# Patient Record
Sex: FEMALE | Race: ASIAN | NOT HISPANIC OR LATINO | URBAN - METROPOLITAN AREA
[De-identification: names, ages, dates, MRNs, and addresses within clinical notes are randomized per-mention and may not be internally consistent; named-entity substitution may affect disease eponyms.]

---

## 2021-07-21 ENCOUNTER — EMERGENCY (EMERGENCY)
Facility: HOSPITAL | Age: 41
LOS: 1 days | Discharge: STILL ADMITTED OR ADMINISTRATIVE | End: 2021-07-21
Admitting: EMERGENCY MEDICINE
Payer: COMMERCIAL

## 2021-07-21 ENCOUNTER — INPATIENT (INPATIENT)
Facility: HOSPITAL | Age: 41
LOS: 0 days | Discharge: HOME | End: 2021-07-22
Attending: SPECIALIST | Admitting: SPECIALIST
Payer: COMMERCIAL

## 2021-07-21 VITALS
OXYGEN SATURATION: 98 % | HEART RATE: 107 BPM | TEMPERATURE: 98 F | RESPIRATION RATE: 18 BRPM | DIASTOLIC BLOOD PRESSURE: 124 MMHG | SYSTOLIC BLOOD PRESSURE: 170 MMHG

## 2021-07-21 VITALS — TEMPERATURE: 99 F | DIASTOLIC BLOOD PRESSURE: 88 MMHG | SYSTOLIC BLOOD PRESSURE: 143 MMHG | HEART RATE: 136 BPM

## 2021-07-21 DIAGNOSIS — O14.90 UNSPECIFIED PRE-ECLAMPSIA, UNSPECIFIED TRIMESTER: ICD-10-CM

## 2021-07-21 LAB
ALBUMIN SERPL ELPH-MCNC: 3.9 G/DL — SIGNIFICANT CHANGE UP (ref 3.3–5)
ALP SERPL-CCNC: 102 U/L — SIGNIFICANT CHANGE UP (ref 40–120)
ALT FLD-CCNC: 18 U/L — SIGNIFICANT CHANGE UP (ref 4–33)
ANION GAP SERPL CALC-SCNC: 15 MMOL/L — HIGH (ref 7–14)
APPEARANCE UR: ABNORMAL
APTT BLD: 29.7 SEC — SIGNIFICANT CHANGE UP (ref 27–36.3)
AST SERPL-CCNC: 19 U/L — SIGNIFICANT CHANGE UP (ref 4–32)
BACTERIA # UR AUTO: NEGATIVE — SIGNIFICANT CHANGE UP
BASOPHILS # BLD AUTO: 0.04 K/UL — SIGNIFICANT CHANGE UP (ref 0–0.2)
BASOPHILS NFR BLD AUTO: 0.5 % — SIGNIFICANT CHANGE UP (ref 0–2)
BILIRUB SERPL-MCNC: <0.2 MG/DL — SIGNIFICANT CHANGE UP (ref 0.2–1.2)
BILIRUB UR-MCNC: NEGATIVE — SIGNIFICANT CHANGE UP
BLD GP AB SCN SERPL QL: NEGATIVE — SIGNIFICANT CHANGE UP
BUN SERPL-MCNC: 11 MG/DL — SIGNIFICANT CHANGE UP (ref 7–23)
CALCIUM SERPL-MCNC: 9.4 MG/DL — SIGNIFICANT CHANGE UP (ref 8.4–10.5)
CHLORIDE SERPL-SCNC: 105 MMOL/L — SIGNIFICANT CHANGE UP (ref 98–107)
CO2 SERPL-SCNC: 21 MMOL/L — LOW (ref 22–31)
COLOR SPEC: ABNORMAL
CREAT ?TM UR-MCNC: 13 MG/DL — SIGNIFICANT CHANGE UP
CREAT SERPL-MCNC: 0.62 MG/DL — SIGNIFICANT CHANGE UP (ref 0.5–1.3)
DIFF PNL FLD: ABNORMAL
EOSINOPHIL # BLD AUTO: 0.15 K/UL — SIGNIFICANT CHANGE UP (ref 0–0.5)
EOSINOPHIL NFR BLD AUTO: 1.7 % — SIGNIFICANT CHANGE UP (ref 0–6)
EPI CELLS # UR: 4 /HPF — SIGNIFICANT CHANGE UP (ref 0–5)
FIBRINOGEN PPP-MCNC: 640 MG/DL — HIGH (ref 290–520)
GLUCOSE SERPL-MCNC: 94 MG/DL — SIGNIFICANT CHANGE UP (ref 70–99)
GLUCOSE UR QL: NEGATIVE — SIGNIFICANT CHANGE UP
HCT VFR BLD CALC: 38 % — SIGNIFICANT CHANGE UP (ref 34.5–45)
HGB BLD-MCNC: 11.9 G/DL — SIGNIFICANT CHANGE UP (ref 11.5–15.5)
HYALINE CASTS # UR AUTO: 4 /LPF — SIGNIFICANT CHANGE UP (ref 0–7)
IANC: 6.88 K/UL — SIGNIFICANT CHANGE UP (ref 1.5–8.5)
IMM GRANULOCYTES NFR BLD AUTO: 0.9 % — SIGNIFICANT CHANGE UP (ref 0–1.5)
INR BLD: 0.97 RATIO — SIGNIFICANT CHANGE UP (ref 0.88–1.16)
KETONES UR-MCNC: NEGATIVE — SIGNIFICANT CHANGE UP
LDH SERPL L TO P-CCNC: 196 U/L — SIGNIFICANT CHANGE UP (ref 135–225)
LEUKOCYTE ESTERASE UR-ACNC: ABNORMAL
LYMPHOCYTES # BLD AUTO: 1.28 K/UL — SIGNIFICANT CHANGE UP (ref 1–3.3)
LYMPHOCYTES # BLD AUTO: 14.5 % — SIGNIFICANT CHANGE UP (ref 13–44)
MAGNESIUM SERPL-MCNC: 5.2 MG/DL — HIGH (ref 1.6–2.6)
MCHC RBC-ENTMCNC: 24.8 PG — LOW (ref 27–34)
MCHC RBC-ENTMCNC: 31.3 GM/DL — LOW (ref 32–36)
MCV RBC AUTO: 79.2 FL — LOW (ref 80–100)
MONOCYTES # BLD AUTO: 0.37 K/UL — SIGNIFICANT CHANGE UP (ref 0–0.9)
MONOCYTES NFR BLD AUTO: 4.2 % — SIGNIFICANT CHANGE UP (ref 2–14)
NEUTROPHILS # BLD AUTO: 6.88 K/UL — SIGNIFICANT CHANGE UP (ref 1.8–7.4)
NEUTROPHILS NFR BLD AUTO: 78.2 % — HIGH (ref 43–77)
NITRITE UR-MCNC: NEGATIVE — SIGNIFICANT CHANGE UP
NRBC # BLD: 0 /100 WBCS — SIGNIFICANT CHANGE UP
NRBC # FLD: 0 K/UL — SIGNIFICANT CHANGE UP
PH UR: 7.5 — SIGNIFICANT CHANGE UP (ref 5–8)
PLATELET # BLD AUTO: 210 K/UL — SIGNIFICANT CHANGE UP (ref 150–400)
POTASSIUM SERPL-MCNC: 4.4 MMOL/L — SIGNIFICANT CHANGE UP (ref 3.5–5.3)
POTASSIUM SERPL-SCNC: 4.4 MMOL/L — SIGNIFICANT CHANGE UP (ref 3.5–5.3)
PROT ?TM UR-MCNC: 43 MG/DL — SIGNIFICANT CHANGE UP
PROT ?TM UR-MCNC: 43 MG/DL — SIGNIFICANT CHANGE UP
PROT SERPL-MCNC: 7.1 G/DL — SIGNIFICANT CHANGE UP (ref 6–8.3)
PROT UR-MCNC: ABNORMAL
PROT/CREAT UR-RTO: 3.3 RATIO — HIGH (ref 0–0.2)
PROTHROM AB SERPL-ACNC: 11.2 SEC — SIGNIFICANT CHANGE UP (ref 10.6–13.6)
RBC # BLD: 4.8 M/UL — SIGNIFICANT CHANGE UP (ref 3.8–5.2)
RBC # FLD: 16 % — HIGH (ref 10.3–14.5)
RBC CASTS # UR COMP ASSIST: 21 /HPF — HIGH (ref 0–4)
RH IG SCN BLD-IMP: POSITIVE — SIGNIFICANT CHANGE UP
RH IG SCN BLD-IMP: POSITIVE — SIGNIFICANT CHANGE UP
SARS-COV-2 RNA SPEC QL NAA+PROBE: SIGNIFICANT CHANGE UP
SODIUM SERPL-SCNC: 141 MMOL/L — SIGNIFICANT CHANGE UP (ref 135–145)
SP GR SPEC: 1.01 — LOW (ref 1.01–1.02)
URATE SERPL-MCNC: 6.2 MG/DL — SIGNIFICANT CHANGE UP (ref 2.5–7)
UROBILINOGEN FLD QL: SIGNIFICANT CHANGE UP
WBC # BLD: 8.8 K/UL — SIGNIFICANT CHANGE UP (ref 3.8–10.5)
WBC # FLD AUTO: 8.8 K/UL — SIGNIFICANT CHANGE UP (ref 3.8–10.5)
WBC UR QL: 44 /HPF — HIGH (ref 0–5)

## 2021-07-21 RX ORDER — MAGNESIUM SULFATE 500 MG/ML
2 VIAL (ML) INJECTION
Qty: 40 | Refills: 0 | Status: DISCONTINUED | OUTPATIENT
Start: 2021-07-21 | End: 2021-07-22

## 2021-07-21 RX ORDER — NIFEDIPINE 30 MG
30 TABLET, EXTENDED RELEASE 24 HR ORAL EVERY 24 HOURS
Refills: 0 | Status: DISCONTINUED | OUTPATIENT
Start: 2021-07-21 | End: 2021-07-21

## 2021-07-21 RX ORDER — SODIUM CHLORIDE 9 MG/ML
1000 INJECTION, SOLUTION INTRAVENOUS
Refills: 0 | Status: DISCONTINUED | OUTPATIENT
Start: 2021-07-21 | End: 2021-07-22

## 2021-07-21 RX ORDER — NIFEDIPINE 30 MG
30 TABLET, EXTENDED RELEASE 24 HR ORAL DAILY
Refills: 0 | Status: DISCONTINUED | OUTPATIENT
Start: 2021-07-21 | End: 2021-07-21

## 2021-07-21 RX ORDER — ACETAMINOPHEN 500 MG
975 TABLET ORAL ONCE
Refills: 0 | Status: COMPLETED | OUTPATIENT
Start: 2021-07-21 | End: 2021-07-21

## 2021-07-21 RX ORDER — HEPARIN SODIUM 5000 [USP'U]/ML
5000 INJECTION INTRAVENOUS; SUBCUTANEOUS EVERY 12 HOURS
Refills: 0 | Status: DISCONTINUED | OUTPATIENT
Start: 2021-07-21 | End: 2021-07-22

## 2021-07-21 RX ORDER — NIFEDIPINE 30 MG
30 TABLET, EXTENDED RELEASE 24 HR ORAL AT BEDTIME
Refills: 0 | Status: DISCONTINUED | OUTPATIENT
Start: 2021-07-21 | End: 2021-07-22

## 2021-07-21 RX ORDER — NIFEDIPINE 30 MG
30 TABLET, EXTENDED RELEASE 24 HR ORAL DAILY
Refills: 0 | Status: DISCONTINUED | OUTPATIENT
Start: 2021-07-21 | End: 2021-07-22

## 2021-07-21 RX ORDER — HYDRALAZINE HCL 50 MG
10 TABLET ORAL ONCE
Refills: 0 | Status: COMPLETED | OUTPATIENT
Start: 2021-07-21 | End: 2021-07-21

## 2021-07-21 RX ORDER — MAGNESIUM SULFATE 500 MG/ML
4 VIAL (ML) INJECTION ONCE
Refills: 0 | Status: COMPLETED | OUTPATIENT
Start: 2021-07-21 | End: 2021-07-21

## 2021-07-21 RX ORDER — MAGNESIUM SULFATE 500 MG/ML
2 VIAL (ML) INJECTION
Qty: 40 | Refills: 0 | Status: DISCONTINUED | OUTPATIENT
Start: 2021-07-21 | End: 2021-07-21

## 2021-07-21 RX ADMIN — Medication 975 MILLIGRAM(S): at 21:14

## 2021-07-21 RX ADMIN — Medication 50 GM/HR: at 16:41

## 2021-07-21 RX ADMIN — Medication 975 MILLIGRAM(S): at 20:14

## 2021-07-21 RX ADMIN — Medication 30 MILLIGRAM(S): at 20:19

## 2021-07-21 RX ADMIN — HEPARIN SODIUM 5000 UNIT(S): 5000 INJECTION INTRAVENOUS; SUBCUTANEOUS at 20:19

## 2021-07-21 RX ADMIN — Medication 10 MILLIGRAM(S): at 16:02

## 2021-07-21 RX ADMIN — Medication 200 GRAM(S): at 16:17

## 2021-07-21 RX ADMIN — SODIUM CHLORIDE 50 MILLILITER(S): 9 INJECTION, SOLUTION INTRAVENOUS at 18:55

## 2021-07-21 RX ADMIN — Medication 10 MILLIGRAM(S): at 16:15

## 2021-07-21 RX ADMIN — Medication 50 GM/HR: at 21:06

## 2021-07-21 NOTE — H&P ADULT - NSHPPHYSICALEXAM_GEN_ALL_CORE
abdomen: soft, nontender  lungs: clear bilaterally  heart: regular rate and rhythm  extremities: bilateral pedal edema  Vital Signs Last 24 Hrs  T(C): 36.7 (21 Jul 2021 15:31), Max: 36.7 (21 Jul 2021 15:31)  T(F): 98 (21 Jul 2021 15:31), Max: 98 (21 Jul 2021 15:31)  HR: 107 (21 Jul 2021 15:31) (107 - 107)  BP: 170/124 (21 Jul 2021 15:31) (170/124 - 170/124)  BP(mean): --  RR: 18 (21 Jul 2021 15:31) (18 - 18)  SpO2: 98% (21 Jul 2021 15:31) (98% - 98%) abdomen: soft, nontender  lungs: clear bilaterally  heart: regular rate and rhythm  extremities: bilateral pedal edema    Vital Signs Last 24 Hrs  T(C): 36.7 (21 Jul 2021 15:31), Max: 36.7 (21 Jul 2021 15:31)  T(F): 98 (21 Jul 2021 15:31), Max: 98 (21 Jul 2021 15:31)  HR: 107 (21 Jul 2021 15:31) (107 - 107)  BP: 170/124 (21 Jul 2021 15:31) (170/124 - 170/124)  BP(mean): --  RR: 18 (21 Jul 2021 15:31) (18 - 18)  SpO2: 98% (21 Jul 2021 15:31) (98% - 98%) abdomen: soft, nontender  lungs: clear bilaterally  heart: regular rate and rhythm  extremities: bilateral pedal edema  +3 DTR's    Vital Signs Last 24 Hrs  T(C): 36.7 (21 Jul 2021 15:31), Max: 36.7 (21 Jul 2021 15:31)  T(F): 98 (21 Jul 2021 15:31), Max: 98 (21 Jul 2021 15:31)  HR: 107 (21 Jul 2021 15:31) (107 - 107)  BP: 170/124 (21 Jul 2021 15:31) (170/124 - 170/124)  BP(mean): --  RR: 18 (21 Jul 2021 15:31) (18 - 18)  SpO2: 98% (21 Jul 2021 15:31) (98% - 98%)

## 2021-07-21 NOTE — ED ADULT TRIAGE NOTE - CHIEF COMPLAINT QUOTE
p/t is 5 days postpartum c/o of high b/p readings for past few days, sent by PMD for eval, p/t denies any chest pain denies sob - pre renal secondary to hypotention   - continue to trend creat   - if worsening renal function will check urine lytes / renal US

## 2021-07-21 NOTE — CHART NOTE - NSCHARTNOTEFT_GEN_A_CORE
R4 OB Chart Note    Patient seen at bedside for severe range BP 160s/110s s/p Hydral 10 x 1    40y  PPD#5 s/p  at [facility] s/p IOL for worsening chronic hypertension now presenting with severe range BPs at home.     Patient reports that she has had labile blood pressures prior to pregnancy which improved in the second trimester. Blood pressures then increased several weeks prior to delivery. BPs for the last few weeks 140s/90s, severe ranges. Delivery was uncomplicated--she did not require Mg, and was not started on antihypertensives. At home, her blood pressures have been stable until this morning--160s/90s. She was evaluated by her Ob/Gyn, where BPs where still severe so she was sent to the hospital.     Patient denies headache, blurry vision, chest pain, shortness of breath, epigastric pain, nausea, or vomiting.     # sPEC  - s/p Hydral 10 IV x 2  - start MgSO4 for seizure prophylaxis x 24h  - Mg levels q6h  - Monitor clinically for Mg toxicity  - f/u HELLP labs  - start Procardia 30XL if BPs stable after IV pushes  - patient counseled re: PEC signs, symptoms, expectations, and reviewed plan of care. Patient agrees with plan, confirms that she has blood pressure medication    # postpartum  - Regular diet  - patient breastfeeding and pumping--baby admitted to [facility] for hyperbilirubinemia. Breastpump to be provided, patient reassured of safety of all medications in breastfeeding.    d/w Dr. Eamon Whitley PGY4 R4 OB Chart Note    Patient seen at bedside for severe range BP 160s/110s s/p Hydral 10 x 1    40y  PPD#5 s/p  at [facility] s/p IOL for worsening chronic hypertension now presenting with severe range BPs at home.     Patient reports that she has had labile blood pressures prior to pregnancy which improved in the second trimester. Blood pressures then increased several weeks prior to delivery. BPs for the last few weeks 140s/90s, severe ranges. Delivery was uncomplicated--she did not require Mg, and was not started on antihypertensives. At home, her blood pressures have been stable until this morning--160s/90s. She was evaluated by her Ob/Gyn, where BPs where still severe so she was sent to the hospital.     Patient denies headache, blurry vision, chest pain, shortness of breath, epigastric pain, nausea, or vomiting.     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 Patient agrees with plan, confirms that she has blood pressure medication    # postpartum  - Regular diet  - patient breastfeeding and pumping--baby admitted to [facility] for hyperbilirubinemia. Breastpump to be provided, patient reassured of safety of all medications in breastfeeding.    d/w Dr. Eamon Whitley PGY4

## 2021-07-21 NOTE — H&P ADULT - HISTORY OF PRESENT ILLNESS
This is a 39 y/o  s/p  on 21 at [facility] presents today to triage for elevated blood pressures at home of 170/110. She was induced on  for elevated blood pressures, denies magnesium or blood pressure medications. Denies headache, n/v, visual changes, epigastric pain    Allergies: Latex, shellfish (hives, itching, swelling)  Current medications:  -PNV  OBGYN history:  -  @35.5 weeks 6lbs 2oz  - 21 @ 37.1 wks  -1 SAB @ 19 wks complete  Medical history This is a 39 y/o  s/p  on 21 at [facility] presents today to triage for elevated blood pressures at home of 170/110. She was induced on  for elevated blood pressures s/p cerclage removal, denies magnesium or blood pressure medications. Pt was GDMA2 on Glyburide during pregnancy. Denies headache, n/v, visual changes, epigastric pain.    Allergies: Latex, shellfish (hives, itching, swelling)  Current medications:  -PNV  OBGYN history:  -  @35.5 weeks 6lbs 2oz  - 21 @ 37.1 wks  -1 SAB @ 19 wks complete  Medical history:  -asthma, as a child  Denies surgical history This is a 39 y/o  s/p  @ 37.1 wks gestation on 21 at [facility] presents today to triage for elevated blood pressures at home of 170/110. She was induced on  for elevated BP's, denies magnesium or blood pressure medications. Pt was GDMA2 on Glyburide during pregnancy with a cerclage from 16-37 weeks gestation for h/o shortened cervix. Baby had elevated bilirubin levels after f/u visit w/ pediatrician, admitted to [facility] NICU. Denies headache, n/v, visual changes, epigastric pain.    Allergies: Latex, shellfish (hives, itching, swelling)  Current medications:  -PNV  OBGYN history:  -  @35.5 weeks 6lbs 2oz  - 21 @ 37.1 wks  -1 SAB @ 19 wks complete  Medical history:  -asthma, as a child  Denies surgical history

## 2021-07-21 NOTE — H&P ADULT - NSHPLABSRESULTS_GEN_ALL_CORE
11.9   8.80  )-----------( 210      ( 2021 16:38 )             38.0     Urinalysis Basic - ( 2021 16:38 )  Color: Light Brown / Appearance: Slightly Turbid / S.006 / pH: x  Gluc: x / Ketone: Negative  / Bili: Negative / Urobili: <2 mg/dL   Blood: x / Protein: 30 mg/dL / Nitrite: Negative   Leuk Esterase: Large / RBC: 21 /HPF / WBC 44 /HPF   Sq Epi: x / Non Sq Epi: 4 /HPF / Bacteria: Negative    PT/INR - ( 2021 16:38 )   PT: 11.2 sec;   INR: 0.97 ratio         PTT - ( 2021 16:38 )  PTT:29.7 sec 11.9   8.80  )-----------( 210      ( 2021 16:38 )             38.0     Urinalysis Basic - ( 2021 16:38 )  Color: Light Brown / Appearance: Slightly Turbid / S.006 / pH: x  Gluc: x / Ketone: Negative  / Bili: Negative / Urobili: <2 mg/dL   Blood: x / Protein: 30 mg/dL / Nitrite: Negative   Leuk Esterase: Large / RBC: 21 /HPF / WBC 44 /HPF   Sq Epi: x / Non Sq Epi: 4 /HPF / Bacteria: Negative    PT/INR - ( 2021 16:38 )   PT: 11.2 sec;   INR: 0.97 ratio         PTT - ( 2021 16:38 )  PTT:29.7 sec        141  |  105  |  11  ----------------------------<  94  4.4   |  21<L>  |  0.62    Ca    9.4      2021 16:38    TPro  7.1  /  Alb  3.9  /  TBili  <0.2  /  DBili  x   /  AST  19  /  ALT  18  /  AlkPhos  102  -

## 2021-07-21 NOTE — H&P ADULT - ASSESSMENT
This is a 39 y/o  s/p  on 21 presents to triage for elevated blood pressures of 170/110 This is a 39 y/o  s/p  on 21 presents to triage for elevated blood pressures of 170/110.    16:02 - assessment reveals elevated BP of 168/102, following 177/119; 10mg hydralazine IVP given  16:15 - repeat /112; 10mg hydralazine IVP given. Magnesium started for seizure prophylaxis  d/w MD Knutson  17:00 - EKG ordered for sustained elevated pulse rate of 130bpm    17:30 - plan d/w MD Phelps. Will reassess at 18:00

## 2021-07-22 ENCOUNTER — TRANSCRIPTION ENCOUNTER (OUTPATIENT)
Age: 41
End: 2021-07-22

## 2021-07-22 VITALS
TEMPERATURE: 98 F | SYSTOLIC BLOOD PRESSURE: 128 MMHG | HEART RATE: 96 BPM | DIASTOLIC BLOOD PRESSURE: 89 MMHG | RESPIRATION RATE: 17 BRPM | OXYGEN SATURATION: 99 %

## 2021-07-22 LAB
COVID-19 SPIKE DOMAIN AB INTERP: NEGATIVE — SIGNIFICANT CHANGE UP
COVID-19 SPIKE DOMAIN ANTIBODY RESULT: 0.4 U/ML — SIGNIFICANT CHANGE UP
MAGNESIUM SERPL-MCNC: 5.9 MG/DL — HIGH (ref 1.6–2.6)
SARS-COV-2 IGG+IGM SERPL QL IA: 0.4 U/ML — SIGNIFICANT CHANGE UP
SARS-COV-2 IGG+IGM SERPL QL IA: NEGATIVE — SIGNIFICANT CHANGE UP

## 2021-07-22 RX ORDER — IBUPROFEN 200 MG
600 TABLET ORAL EVERY 6 HOURS
Refills: 0 | Status: DISCONTINUED | OUTPATIENT
Start: 2021-07-22 | End: 2021-07-22

## 2021-07-22 RX ORDER — NIFEDIPINE 30 MG
1 TABLET, EXTENDED RELEASE 24 HR ORAL
Qty: 30 | Refills: 0
Start: 2021-07-22 | End: 2021-08-20

## 2021-07-22 RX ORDER — METOCLOPRAMIDE HCL 10 MG
10 TABLET ORAL ONCE
Refills: 0 | Status: COMPLETED | OUTPATIENT
Start: 2021-07-22 | End: 2021-07-22

## 2021-07-22 RX ORDER — NIFEDIPINE 30 MG
30 TABLET, EXTENDED RELEASE 24 HR ORAL DAILY
Refills: 0 | Status: DISCONTINUED | OUTPATIENT
Start: 2021-07-22 | End: 2021-07-22

## 2021-07-22 RX ADMIN — Medication 10 MILLIGRAM(S): at 01:59

## 2021-07-22 RX ADMIN — Medication 600 MILLIGRAM(S): at 13:30

## 2021-07-22 RX ADMIN — Medication 30 MILLIGRAM(S): at 19:10

## 2021-07-22 RX ADMIN — Medication 600 MILLIGRAM(S): at 13:12

## 2021-07-22 RX ADMIN — Medication 50 GM/HR: at 07:02

## 2021-07-22 NOTE — PROGRESS NOTE ADULT - ASSESSMENT
41 yo  PPD#6 s/p uncomplicated  at [facility], with infant in NICU at [facility], noted to have BP of 170/110 yesterday with a HA with known h/o cHTN, a/w pp siPEC. Patient is asymptomatic. Currently /80s.    #pp sPEC  - Mag x24 (-) for seizure ppx, will d/c @ 5p today  - s/p Hydral 10/10 ()  - c/w Procardia XL 30 QD  - HELLP labs wnl, repeat PRN  - BP and symptom monitoring  - Cardio OB email to be sent    #PP  - c/w HSQ and venodynes for dvt ppx  - Reg diet  - LR@50    Dispo: discharge planning s/p Mg, likely     ADomney PGY-3 39 yo  PPD#6 s/p uncomplicated  at [facility], with infant in NICU at [facility], noted to have BP of 170/110 yesterday with a HA with known h/o cHTN, a/w pp siPEC. Patient is asymptomatic. Currently /80s.    #pp sPEC  - Mag x24 (-) for seizure ppx, will d/c @ 5p today  - s/p Hydral 10/10 ()  - c/w Procardia XL 30 QD  - HELLP labs wnl, repeat PRN  - BP and symptom monitoring  - Cardio OB email to be sent    #PP  - c/w HSQ and venodynes for dvt ppx  - Reg diet  - LR@50    Dispo: discharge planning s/p Mg, likely     ADomney PGY-3      ***MFM Fellow Addendum***    39 yo  PPD#6 s/p  now admitted w/ ppsPEC. VSS in normal range now with procardia 30mg XL. PEC labs wnl. s/p 12 hr mg gtt. Plan to obs until this evening w/ likely dc. Ambulatory bp monitoring. Has close interval follow up with her provider. Precautions reviewed.    Patient seen with Dr. Fuller (M attending)    Ba Maravilla M.D. PGY-5  Maternal Fetal Medicine Fellow

## 2021-07-22 NOTE — DISCHARGE NOTE PROVIDER - HOSPITAL COURSE
41 yo  s/p uncomplicated  at [facility], with infant in NICU at [facility], noted to have BP of 170/110 yesterday with a HA with known h/o cHTN, a/w pp siPEC; pt s/p Hydral 10/10 on admission. Patient is asymptomatic. S/P Mag for over 12 hours for seizure ppx, Procardia XL 30 QD started with good BP control. HELLP labs wnl, repeat PRN. Pt stable for discharge with follow up in OB office Dr. Gomez on Monday and Cardio OB.

## 2021-07-22 NOTE — PROGRESS NOTE ADULT - TIME BILLING
Acuity of care.     I saw and counseled Ms. Quinonez and agree with assessment and plan as above.   Magnesium sulfate can be discontinued.   Preeclampsia precautions reviewed.   She has a home blood pressure cuff.     All questions were answered.   Estella Fuller MD

## 2021-07-22 NOTE — DISCHARGE NOTE PROVIDER - NSDCFUADDAPPT_GEN_ALL_CORE_FT
- Continue BP meds as prescribed (hold is BP is under 110/60)  -Take blood pressure with at home cuff prior to taking medications; if BP is >150/90 call MD  - Return to hospital with headaches, visual changes, abdominal pain, nausea, vomiting, chest pain or shortness of breathe  - Follow up with OB in 2 days for BP check  - Follow up with [facility] (email sent for follow up; call [phone])

## 2021-07-22 NOTE — DISCHARGE NOTE PROVIDER - PROVIDER TOKENS
FREE:[LAST:[Jason],FIRST:[montana],PHONE:[phone]],FAX:[(   )    -]],PROVIDER:[TOKEN:[[id number]]]

## 2021-07-22 NOTE — DISCHARGE NOTE NURSING/CASE MANAGEMENT/SOCIAL WORK - PATIENT PORTAL LINK FT
You can access the FollowMyHealth Patient Portal offered by [facility] by registering at the following website: [email or web address]. By joining [facility]’s FollowMyHealth portal, you will also be able to view your health information using other applications (apps) compatible with our system.

## 2021-07-22 NOTE — DISCHARGE NOTE NURSING/CASE MANAGEMENT/SOCIAL WORK - NSDCFUADDAPPT_GEN_ALL_CORE_FT
- Continue BP meds as prescribed (hold is BP is under 110/60)  -Take blood pressure with at home cuff prior to taking medications; if BP is >150/90 call MD  - Return to hospital with headaches, visual changes, abdominal pain, nausea, vomiting, chest pain or shortness of breathe  - Follow up with OB in 2 days for BP check  - Follow up with [facility] (email sent for follow up; call [phone])

## 2021-07-22 NOTE — DISCHARGE NOTE PROVIDER - CARE PROVIDERS DIRECT ADDRESSES
,DirectAddress_Unknown,[email or web address]

## 2021-07-22 NOTE — PROGRESS NOTE ADULT - SUBJECTIVE AND OBJECTIVE BOX
OB Progress Note:  PPD#6    S: 41yo PPD#2 s/p . Patient feels well. Pain is well controlled. Mild HA this AM, received Tylenol and Reglan, decreasing at this time. Denies SOB, CP, RUQ/epigastric pain, b/l swelling LE and UE, or vision changes. She is tolerating a regular diet.  She is voiding spontaneously, and ambulating without difficulty. Endorses light vaginal bleeding, soaking less than 1 pad/hour. Denies lightheadedness/dizziness. Denies N/V.    O:  Vitals:   Vital Signs Last 24 Hrs  T(C): 36.7 (2021 05:59), Max: 37 (2021 16:41)  T(F): 98.1 (2021 05:59), Max: 98.6 (2021 16:41)  HR: 83 (2021 05:59) (83 - 136)  BP: 123/80 (2021 05:59) (123/80 - 170/124)  BP(mean): 108 (2021 16:41) (108 - 108)  RR: 18 (2021 05:59) (16 - 20)  SpO2: 100% (2021 05:59) (97% - 100%)    MEDICATIONS  (STANDING):  heparin   Injectable 5000 Unit(s) SubCutaneous every 12 hours  lactated ringers. 1000 milliLiter(s) (50 mL/Hr) IV Continuous <Continuous>  magnesium sulfate Infusion 2 Gm/Hr (50 mL/Hr) IV Continuous <Continuous>  NIFEdipine XL 30 milliGRAM(s) Oral daily  NIFEdipine XL 30 milliGRAM(s) Oral daily    MEDICATIONS  (PRN):      Labs:  Blood type: AB Positive  Rubella IgG: RPR:                           11.9   8.80 >-----------< 210    (  @ 16:38 )             38.0    21 @ 16:38      141  |  105  |  11  ----------------------------<  94  4.4   |  21<L>  |  0.62        Ca    9.4      2021 16:38  Mg     5.90<H>       Mg     5.20<H>         TPro  7.1  /  Alb  3.9  /  TBili  <0.2  /  DBili  x   /  AST  19  /  ALT  18  /  AlkPhos  102  21 @ 16:38          Physical Exam:  General: NAD  Heart: extremities well-perfused  Lungs: breathing comfortably  Abdomen: soft, non-tender, non-distended  Extremities: No calf tenderness or erythema

## 2021-07-22 NOTE — DISCHARGE NOTE PROVIDER - NSDCCPCAREPLAN_GEN_ALL_CORE_FT
PRINCIPAL DISCHARGE DIAGNOSIS  Diagnosis: Preeclampsia in postpartum period  Assessment and Plan of Treatment: - Continue BP meds as prescribed (hold is BP is under 110/60 or HR is under 60)  -Take blood pressure with at home cuff prior to taking medications; if BP is >150/90 call MD  - Return to hospital with headaches, visual changes, abdominal pain, nausea, vomiting, chest pain or shortness of breathe  - Follow up with OB in 2 days for BP check  - Follow up with [facility] (email sent for follow up; call [phone])       PRINCIPAL DISCHARGE DIAGNOSIS  Diagnosis: Preeclampsia in postpartum period  Assessment and Plan of Treatment: - Continue BP meds as prescribed (hold is BP is under 110/60)  -Take blood pressure with at home cuff prior to taking medications; if BP is >150/90 call MD  - Return to hospital with headaches, visual changes, abdominal pain, nausea, vomiting, chest pain or shortness of breathe  - Follow up with OB in 2 days for BP check  - Follow up with [facility] (email sent for follow up; call [phone])

## 2021-07-30 DIAGNOSIS — Z86.32 PERSONAL HISTORY OF GESTATIONAL DIABETES: ICD-10-CM

## 2021-07-30 DIAGNOSIS — F41.9 ANXIETY DISORDER, UNSPECIFIED: ICD-10-CM

## 2021-07-30 DIAGNOSIS — J45.909 UNSPECIFIED ASTHMA, UNCOMPLICATED: ICD-10-CM

## 2021-08-02 ENCOUNTER — APPOINTMENT (OUTPATIENT)
Dept: CARDIOLOGY | Facility: CLINIC | Age: 41
End: 2021-08-02
Payer: COMMERCIAL

## 2021-08-02 ENCOUNTER — NON-APPOINTMENT (OUTPATIENT)
Age: 41
End: 2021-08-02

## 2021-08-02 VITALS
HEART RATE: 91 BPM | DIASTOLIC BLOOD PRESSURE: 87 MMHG | HEIGHT: 62 IN | BODY MASS INDEX: 25.21 KG/M2 | SYSTOLIC BLOOD PRESSURE: 133 MMHG | OXYGEN SATURATION: 99 % | WEIGHT: 137 LBS

## 2021-08-02 DIAGNOSIS — O14.90 UNSPECIFIED PRE-ECLAMPSIA, UNSPECIFIED TRIMESTER: ICD-10-CM

## 2021-08-02 PROCEDURE — 99203 OFFICE O/P NEW LOW 30 MIN: CPT

## 2021-08-02 RX ORDER — NIFEDIPINE 30 MG/1
30 TABLET, EXTENDED RELEASE ORAL DAILY
Refills: 0 | Status: DISCONTINUED | COMMUNITY
End: 2021-08-02

## 2021-08-02 NOTE — HISTORY OF PRESENT ILLNESS
[FreeTextEntry1] : 40 years old  who is 2 weeks postpartum delivered 21 at 37 weeks induced after cerclage dc and BP elevated. Was sent home with BP monitoring and then 5 days post partum BP at home 166/105\par Went back to hospital and given Mg admitted 1 day and sent home on Procardia 30 mg daily\par \par Bp since that time has been good at home\par Feels well\par EKG ordered due to PEC\par EKG reviewed and normal\par \par Does complain of headache that is mild that goes away with Tylenol\par

## 2021-08-02 NOTE — DISCUSSION/SUMMARY
[FreeTextEntry1] : 40 year old woman  who is 2 weeks post partum with post partum PEC\par Given headaches with switch to Labetalol 300 mg BID\par Check TTE\par FU in 6-8 weeks\par

## 2021-08-03 PROBLEM — O03.9 COMPLETE OR UNSPECIFIED SPONTANEOUS ABORTION WITHOUT COMPLICATION: Chronic | Status: ACTIVE | Noted: 2021-07-21

## 2021-08-03 PROBLEM — F41.9 ANXIETY DISORDER, UNSPECIFIED: Chronic | Status: ACTIVE | Noted: 2021-07-21

## 2021-08-03 PROBLEM — J45.909 UNSPECIFIED ASTHMA, UNCOMPLICATED: Chronic | Status: ACTIVE | Noted: 2021-07-21

## 2021-10-01 ENCOUNTER — APPOINTMENT (OUTPATIENT)
Dept: CV DIAGNOSITCS | Facility: HOSPITAL | Age: 41
End: 2021-10-01
Payer: COMMERCIAL

## 2021-10-01 ENCOUNTER — OUTPATIENT (OUTPATIENT)
Dept: OUTPATIENT SERVICES | Facility: HOSPITAL | Age: 41
LOS: 1 days | End: 2021-10-01

## 2021-10-01 ENCOUNTER — NON-APPOINTMENT (OUTPATIENT)
Age: 41
End: 2021-10-01

## 2021-10-01 ENCOUNTER — APPOINTMENT (OUTPATIENT)
Dept: CARDIOLOGY | Facility: CLINIC | Age: 41
End: 2021-10-01
Payer: COMMERCIAL

## 2021-10-01 VITALS
DIASTOLIC BLOOD PRESSURE: 85 MMHG | OXYGEN SATURATION: 97 % | SYSTOLIC BLOOD PRESSURE: 124 MMHG | BODY MASS INDEX: 28.34 KG/M2 | HEART RATE: 95 BPM | HEIGHT: 62 IN | RESPIRATION RATE: 16 BRPM | WEIGHT: 154 LBS | TEMPERATURE: 98.1 F

## 2021-10-01 PROCEDURE — 93000 ELECTROCARDIOGRAM COMPLETE: CPT

## 2021-10-01 PROCEDURE — 99213 OFFICE O/P EST LOW 20 MIN: CPT

## 2021-10-01 PROCEDURE — 93306 TTE W/DOPPLER COMPLETE: CPT | Mod: 26

## 2021-10-01 RX ORDER — LABETALOL HYDROCHLORIDE 300 MG/1
300 TABLET, FILM COATED ORAL
Qty: 60 | Refills: 2 | Status: DISCONTINUED | COMMUNITY
Start: 2021-08-02 | End: 2021-10-01

## 2021-10-01 NOTE — HISTORY OF PRESENT ILLNESS
[FreeTextEntry1] : 40 years old  who delivered 21 at 37 weeks induced after cerclage dc and BP elevated. Was sent home with BP monitoring and then 5 days post partum BP at home 166/105\par Went back to hospital and given Mg admitted 1 day and sent home on Procardia 30 mg daily\par \par Bp since that time has been good at home\par Feels well\par EKG ordered due to PEC\par EKG reviewed and normal\par \par Does complain of headache that is mild that goes away with Tylenol\par Off procardia\par

## 2021-10-01 NOTE — DISCUSSION/SUMMARY
[FreeTextEntry1] : 40 year old woman  who is 2 weeks post partum with post partum PEC\par TTE normal\par Check Lipds/CMP\par FU in4 months

## 2021-10-04 DIAGNOSIS — E78.5 HYPERLIPIDEMIA, UNSPECIFIED: ICD-10-CM

## 2021-10-04 LAB
ALBUMIN SERPL ELPH-MCNC: 4.4 G/DL
ALP BLD-CCNC: 73 U/L
ALT SERPL-CCNC: 35 U/L
ANION GAP SERPL CALC-SCNC: 21 MMOL/L
AST SERPL-CCNC: 28 U/L
BILIRUB SERPL-MCNC: 0.2 MG/DL
BUN SERPL-MCNC: 11 MG/DL
CALCIUM SERPL-MCNC: 9.5 MG/DL
CHLORIDE SERPL-SCNC: 99 MMOL/L
CHOLEST SERPL-MCNC: 280 MG/DL
CO2 SERPL-SCNC: 18 MMOL/L
CREAT SERPL-MCNC: 0.56 MG/DL
ESTIMATED AVERAGE GLUCOSE: 131 MG/DL
GLUCOSE SERPL-MCNC: 66 MG/DL
HBA1C MFR BLD HPLC: 6.2 %
HDLC SERPL-MCNC: 32 MG/DL
LDLC SERPL CALC-MCNC: NORMAL MG/DL
NONHDLC SERPL-MCNC: 248 MG/DL
POTASSIUM SERPL-SCNC: 4.7 MMOL/L
PROT SERPL-MCNC: 7.8 G/DL
SODIUM SERPL-SCNC: 138 MMOL/L
TRIGL SERPL-MCNC: 631 MG/DL

## 2021-10-04 RX ORDER — ATORVASTATIN CALCIUM 20 MG/1
20 TABLET, FILM COATED ORAL
Qty: 90 | Refills: 3 | Status: ACTIVE | COMMUNITY
Start: 2021-10-04 | End: 1900-01-01

## 2022-01-03 ENCOUNTER — TRANSCRIPTION ENCOUNTER (OUTPATIENT)
Age: 42
End: 2022-01-03

## 2023-12-13 NOTE — DISCHARGE NOTE NURSING/CASE MANAGEMENT/SOCIAL WORK - WILL THE PATIENT ACCEPT THE PFIZER COVID-19 VACCINE IF ELIGIBLE AND IT IS AVAILABLE?
-- DO NOT REPLY / DO NOT REPLY ALL --  -- Message is from Engagement Center Operations (ECO) --    General Patient Message: Adelina from [facility] calling regarding patient scheduling appointment for acupuncture. She would like to fax over referral to office this is her third attempt but if it could be fax back when completed at [phone]. Please contact to assist     Caller Information         Type Contact Phone/Fax    12/12/2023 01:58 PM CST Phone (Incoming) adelina [phone]     ext [phone]          Alternative phone number: No     Can a detailed message be left? Yes    Message Turnaround: [place]:    Refer to site's KB page for routing instructions    Please give this turnaround time to the caller:   \"You can expect to receive a response 1-3 business days after your provider's clinical team reviews the message\"               Patient is scheduled for January 10.   No Yes

## 2024-05-02 ENCOUNTER — EMERGENCY (EMERGENCY)
Facility: HOSPITAL | Age: 44
LOS: 1 days | Discharge: HOME | End: 2024-05-02
Attending: EMERGENCY MEDICINE | Admitting: EMERGENCY MEDICINE
Payer: COMMERCIAL

## 2024-05-02 VITALS
DIASTOLIC BLOOD PRESSURE: 99 MMHG | OXYGEN SATURATION: 95 % | HEART RATE: 94 BPM | RESPIRATION RATE: 17 BRPM | SYSTOLIC BLOOD PRESSURE: 132 MMHG

## 2024-05-02 VITALS
OXYGEN SATURATION: 98 % | HEART RATE: 115 BPM | DIASTOLIC BLOOD PRESSURE: 103 MMHG | TEMPERATURE: 99 F | RESPIRATION RATE: 18 BRPM | SYSTOLIC BLOOD PRESSURE: 143 MMHG

## 2024-05-02 LAB
ALBUMIN SERPL ELPH-MCNC: 4.1 G/DL — SIGNIFICANT CHANGE UP (ref 3.3–5)
ALP SERPL-CCNC: 83 U/L — SIGNIFICANT CHANGE UP (ref 40–120)
ALT FLD-CCNC: 23 U/L — SIGNIFICANT CHANGE UP (ref 4–33)
ANION GAP SERPL CALC-SCNC: 13 MMOL/L — SIGNIFICANT CHANGE UP (ref 7–14)
AST SERPL-CCNC: 17 U/L — SIGNIFICANT CHANGE UP (ref 4–32)
BASOPHILS # BLD AUTO: 0.04 K/UL — SIGNIFICANT CHANGE UP (ref 0–0.2)
BASOPHILS NFR BLD AUTO: 0.4 % — SIGNIFICANT CHANGE UP (ref 0–2)
BILIRUB SERPL-MCNC: <0.2 MG/DL — SIGNIFICANT CHANGE UP (ref 0.2–1.2)
BUN SERPL-MCNC: 11 MG/DL — SIGNIFICANT CHANGE UP (ref 7–23)
CALCIUM SERPL-MCNC: 8.9 MG/DL — SIGNIFICANT CHANGE UP (ref 8.4–10.5)
CHLORIDE SERPL-SCNC: 101 MMOL/L — SIGNIFICANT CHANGE UP (ref 98–107)
CO2 SERPL-SCNC: 24 MMOL/L — SIGNIFICANT CHANGE UP (ref 22–31)
CREAT SERPL-MCNC: 0.59 MG/DL — SIGNIFICANT CHANGE UP (ref 0.5–1.3)
EGFR: 116 ML/MIN/1.73M2 — SIGNIFICANT CHANGE UP
EOSINOPHIL # BLD AUTO: 0.24 K/UL — SIGNIFICANT CHANGE UP (ref 0–0.5)
EOSINOPHIL NFR BLD AUTO: 2.2 % — SIGNIFICANT CHANGE UP (ref 0–6)
GLUCOSE SERPL-MCNC: 176 MG/DL — HIGH (ref 70–99)
HCG SERPL-ACNC: <1 MIU/ML — SIGNIFICANT CHANGE UP
HCT VFR BLD CALC: 36.8 % — SIGNIFICANT CHANGE UP (ref 34.5–45)
HGB BLD-MCNC: 11.6 G/DL — SIGNIFICANT CHANGE UP (ref 11.5–15.5)
IANC: 7.93 K/UL — HIGH (ref 1.8–7.4)
IMM GRANULOCYTES NFR BLD AUTO: 0.6 % — SIGNIFICANT CHANGE UP (ref 0–0.9)
LYMPHOCYTES # BLD AUTO: 2.22 K/UL — SIGNIFICANT CHANGE UP (ref 1–3.3)
LYMPHOCYTES # BLD AUTO: 20.4 % — SIGNIFICANT CHANGE UP (ref 13–44)
MCHC RBC-ENTMCNC: 23.3 PG — LOW (ref 27–34)
MCHC RBC-ENTMCNC: 31.5 GM/DL — LOW (ref 32–36)
MCV RBC AUTO: 74 FL — LOW (ref 80–100)
MONOCYTES # BLD AUTO: 0.37 K/UL — SIGNIFICANT CHANGE UP (ref 0–0.9)
MONOCYTES NFR BLD AUTO: 3.4 % — SIGNIFICANT CHANGE UP (ref 2–14)
NEUTROPHILS # BLD AUTO: 7.93 K/UL — HIGH (ref 1.8–7.4)
NEUTROPHILS NFR BLD AUTO: 73 % — SIGNIFICANT CHANGE UP (ref 43–77)
NRBC # BLD: 0 /100 WBCS — SIGNIFICANT CHANGE UP (ref 0–0)
NRBC # FLD: 0 K/UL — SIGNIFICANT CHANGE UP (ref 0–0)
PLATELET # BLD AUTO: 267 K/UL — SIGNIFICANT CHANGE UP (ref 150–400)
POTASSIUM SERPL-MCNC: 4.3 MMOL/L — SIGNIFICANT CHANGE UP (ref 3.5–5.3)
POTASSIUM SERPL-SCNC: 4.3 MMOL/L — SIGNIFICANT CHANGE UP (ref 3.5–5.3)
PROT SERPL-MCNC: 6.8 G/DL — SIGNIFICANT CHANGE UP (ref 6–8.3)
RBC # BLD: 4.97 M/UL — SIGNIFICANT CHANGE UP (ref 3.8–5.2)
RBC # FLD: 14.8 % — HIGH (ref 10.3–14.5)
SODIUM SERPL-SCNC: 138 MMOL/L — SIGNIFICANT CHANGE UP (ref 135–145)
WBC # BLD: 10.86 K/UL — HIGH (ref 3.8–10.5)
WBC # FLD AUTO: 10.86 K/UL — HIGH (ref 3.8–10.5)

## 2024-05-02 PROCEDURE — 99284 EMERGENCY DEPT VISIT MOD MDM: CPT

## 2024-05-02 PROCEDURE — 93010 ELECTROCARDIOGRAM REPORT: CPT

## 2024-05-02 PROCEDURE — 71046 X-RAY EXAM CHEST 2 VIEWS: CPT | Mod: 26

## 2024-05-02 RX ORDER — SODIUM CHLORIDE 9 MG/ML
1000 INJECTION INTRAMUSCULAR; INTRAVENOUS; SUBCUTANEOUS ONCE
Refills: 0 | Status: COMPLETED | OUTPATIENT
Start: 2024-05-02 | End: 2024-05-02

## 2024-05-02 RX ORDER — KETOROLAC TROMETHAMINE 30 MG/ML
15 SYRINGE (ML) INJECTION ONCE
Refills: 0 | Status: DISCONTINUED | OUTPATIENT
Start: 2024-05-02 | End: 2024-05-02

## 2024-05-02 RX ORDER — METOCLOPRAMIDE HCL 10 MG
10 TABLET ORAL ONCE
Refills: 0 | Status: COMPLETED | OUTPATIENT
Start: 2024-05-02 | End: 2024-05-02

## 2024-05-02 RX ADMIN — Medication 10 MILLIGRAM(S): at 21:57

## 2024-05-02 RX ADMIN — SODIUM CHLORIDE 1000 MILLILITER(S): 9 INJECTION INTRAMUSCULAR; INTRAVENOUS; SUBCUTANEOUS at 21:57

## 2024-05-02 RX ADMIN — Medication 15 MILLIGRAM(S): at 21:57

## 2024-05-02 NOTE — ED PROVIDER NOTE - OBJECTIVE STATEMENT
43-year-old female, history of elevated triglycerides, diabetes, presents to ED for few days of intermittent headache, generalized weakness, body aches, and dizziness.  No visual changes, weakness, numbness, tingling.  Patient reports had asthma exacerbation 1 week ago, completed course of steroids and has been using albuterol inhaler as needed.  Patient still noting mild intermittent shortness of breath.  Notes sick contact with coworker at home who was diagnosed with a viral illness.  Patient also reporting loose stools for the past several days with decreased appetite.  No vomiting.  Denies fevers, chest pain.  Patient took her blood pressure at home and noted to be elevated with systolics in 140s which is unusual for her.  Patient became concerned and came to ED for evaluation.

## 2024-05-02 NOTE — ED ADULT TRIAGE NOTE - CHIEF COMPLAINT QUOTE
Patient Education        Bacterial Vaginosis: Care Instructions  Your Care Instructions    Bacterial vaginosis is a type of vaginal infection. It is caused by excess growth of certain bacteria that are normally found in the vagina. Symptoms can include itching, swelling, pain when you urinate or have sex, and a gray or yellow discharge with a \"fishy\" odor. It is not considered an infection that is spread through sexual contact. Although symptoms can be annoying and uncomfortable, bacterial vaginosis does not usually cause other health problems. However, if you have it while you are pregnant, it can cause complications. While the infection may go away on its own, most doctors use antibiotics to treat it. You may have been prescribed pills or vaginal cream. With treatment, bacterial vaginosis usually clears up in 5 to 7 days. Follow-up care is a key part of your treatment and safety. Be sure to make and go to all appointments, and call your doctor if you are having problems. It's also a good idea to know your test results and keep a list of the medicines you take. How can you care for yourself at home? · Take your antibiotics as directed. Do not stop taking them just because you feel better. You need to take the full course of antibiotics. · Do not eat or drink anything that contains alcohol if you are taking metronidazole (Flagyl). · Keep using your medicine if you start your period. Use pads instead of tampons while using a vaginal cream or suppository. Tampons can absorb the medicine. · Wear loose cotton clothing. Do not wear nylon and other materials that hold body heat and moisture close to the skin. · Do not scratch. Relieve itching with a cold pack or a cool bath. · Do not wash your vaginal area more than once a day. Use plain water or a mild, unscented soap. Do not douche. When should you call for help?   Watch closely for changes in your health, and be sure to contact your doctor if:    · You have unexpected vaginal bleeding.     · You have a fever.     · You have new or increased pain in your vagina or pelvis.     · You are not getting better after 1 week.     · Your symptoms return after you finish the course of your medicine. Where can you learn more? Go to http://arley-marshal.info/. Perry Sheriff in the search box to learn more about \"Bacterial Vaginosis: Care Instructions. \"  Current as of: February 19, 2019  Content Version: 12.2  © 4494-8150 [employer], [employer]. Care instructions adapted under license by [employer] (which disclaims liability or warranty for this information). If you have questions about a medical condition or this instruction, always ask your healthcare professional. [address] any warranty or liability for your use of this information. pt c/o high blood pressure, no history of htn. pt c/o intermittent headaches, neck pain and dizziness over past few days, which prompted her to check her BP

## 2024-05-02 NOTE — ED PROVIDER NOTE - NSDCPRINTRESULTS_ED_ALL_ED
Spoke with patient over the phone. She was made aware of her lab results. Ferritin level has significantly improved. Recommend patient still have her phlebotomy treatment scheduled. Also recommend patient see the hematologist. She verbalizes understanding and will F/U as scheduled. All other labs are okay. Patient requests all Lab, Cardiology, and Radiology Results on their Discharge Instructions

## 2024-05-02 NOTE — ED PROVIDER NOTE - CLINICAL SUMMARY MEDICAL DECISION MAKING FREE TEXT BOX
43-year-old female, history of elevated triglycerides, diabetes, presents to ED for few days of intermittent headache, generalized weakness, body aches, and dizziness.  No visual changes, weakness, numbness, tingling.  Patient reports had asthma exacerbation 1 week ago, completed course of steroids and has been using albuterol inhaler as needed.  Patient still noting mild intermittent shortness of breath.  Patient also reporting loose stools for the past several days with decreased appetite.  No vomiting.  Denies fevers, chest pain, cough, congestion, urinary symptoms.  .  Patient took her blood pressure at home and noted to be elevated with systolics in 140s which is unusual for her.  Patient became concerned and came to ED for evaluation.  Blood pressure at /103, tachycardic at 115, low-grade temp 99.  Patient well-appearing, no acute distress, tachycardic with regular rhythm, lungs clear, abdomen soft nontender, no lower extremity edema, no motor or sensory deficits., CN II through XII intact,  5 out of 5 strength x 4, sensation symmetric throughout, normal finger-to-nose bilaterally.  Differential includes but not limited to infectious process-URI, pneumonia, gastroenteritis.  Will check basic labs, hCG, viral panel, chest x-ray, analgesics, IV fluids. 43-year-old female, history of elevated triglycerides, diabetes, presents to ED for few days of intermittent headache, generalized weakness, body aches, and dizziness.  No visual changes, weakness, numbness, tingling.  Patient reports had asthma exacerbation 1 week ago, completed course of steroids and has been using albuterol inhaler as needed.  Patient still noting mild intermittent shortness of breath.  Patient also reporting loose stools for the past several days with decreased appetite.  No vomiting.  Denies fevers, chest pain, cough, congestion, urinary symptoms.  .  Patient took her blood pressure at home and noted to be elevated with systolics in 140s which is unusual for her.  Patient became concerned and came to ED for evaluation.  Blood pressure at /103, tachycardic at 115, low-grade temp 99.  Patient well-appearing, no acute distress, tachycardic with regular rhythm, lungs clear, abdomen soft nontender, no lower extremity edema, no motor or sensory deficits., CN II through XII intact,  5 out of 5 strength x 4, sensation symmetric throughout, normal finger-to-nose bilaterally, PERRL, EOMi BL.  Differential includes but not limited to infectious process-URI, pneumonia, gastroenteritis.  Will check basic labs, hCG, viral panel, chest x-ray, analgesics, IV fluids.

## 2024-05-02 NOTE — ED PROVIDER NOTE - NSFOLLOWUPINSTRUCTIONS_ED_ALL_ED_FT
Advance activity as tolerated.  Continue all previously prescribed medications as directed unless otherwise instructed.  Follow up with your primary care physician in 48-72 hours- bring copies of your results.  Return to the ER for worsening or persistent symptoms, and/or ANY NEW OR CONCERNING SYMPTOMS. If you have issues obtaining follow up, please call: [phone] to obtain a doctor or specialist who takes your insurance in your area.  You may call [phone] to make an appointment with the internal medicine clinic.

## 2024-05-02 NOTE — ED ADULT NURSE NOTE - CHIEF COMPLAINT QUOTE
pt c/o high blood pressure, no history of htn. pt c/o intermittent headaches, neck pain and dizziness over past few days, which prompted her to check her BP

## 2024-05-02 NOTE — ED PROVIDER NOTE - PHYSICAL EXAMINATION
CONSTITUTIONAL: Well appearing, awake, alert, and in no apparent distress.  HEAD: normocephalic, atraumatic  ENT: oral mucosa moist  CARDIAC: regular rate and rhythm; no murmurs, rubs, or gallops  RESPIRATORY: normal breath sounds, clear to ascultation bilaterally, no rales, rhonchi, wheezing  GASTROINTESTINAL: abdomen nondistended, soft, nontender, no guarding, rebound tenderness  MSK: Spine appears normal, range of motion is not limited, no muscle or joint tenderness  NEURO: Alert and oriented, no focal deficits, no motor or sensory deficits. CN II through XII intact,  5 out of 5 strength x 4, sensation symmetric throughout, normal finger-to-nose bilaterally  SKIN: Skin normal color for race, warm, dry and intact. No evidence of rash.  PSYCH: appropriate mood and affect CONSTITUTIONAL: Well appearing, awake, alert, and in no apparent distress.  HEAD: normocephalic, atraumatic  EYES: PERRL, EOMi BL  ENT: oral mucosa moist  CARDIAC: regular rate and rhythm; no murmurs, rubs, or gallops  RESPIRATORY: normal breath sounds, clear to ascultation bilaterally, no rales, rhonchi, wheezing  GASTROINTESTINAL: abdomen nondistended, soft, nontender, no guarding, rebound tenderness  MSK: Spine appears normal, range of motion is not limited, no muscle or joint tenderness  NEURO: Alert and oriented, no focal deficits, no motor or sensory deficits. CN II through XII intact,  5 out of 5 strength x 4, sensation symmetric throughout, normal finger-to-nose bilaterally  SKIN: Skin normal color for race, warm, dry and intact. No evidence of rash.  PSYCH: appropriate mood and affect

## 2024-05-03 LAB
FLUAV AG NPH QL: SIGNIFICANT CHANGE UP
FLUBV AG NPH QL: SIGNIFICANT CHANGE UP
RSV RNA NPH QL NAA+NON-PROBE: SIGNIFICANT CHANGE UP
SARS-COV-2 RNA SPEC QL NAA+PROBE: SIGNIFICANT CHANGE UP

## 2024-05-31 ENCOUNTER — TRANSCRIPTION ENCOUNTER (OUTPATIENT)
Age: 44
End: 2024-05-31

## 2025-05-05 NOTE — ED PROVIDER NOTE - WR ORDER DATE AND TIME 1
Contacted pt to review medications prior to visit on Thursday. Reviewed and updated.   
02-May-2024 21:25

## 2025-06-19 NOTE — ED ADULT TRIAGE NOTE - PAIN: PRESENCE, MLM
Detail Level: Zone Number Of Freeze-Thaw Cycles: 2 freeze-thaw cycles Render Post-Care Instructions In Note?: yes denies pain/discomfort Consent: The patient's verbal consent was obtained including but not limited to risks of crusting, scabbing, blistering, scarring, darker or lighter pigmentary change, recurrence, incomplete removal and infection. Render Note In Bullet Format When Appropriate: No Duration Of Freeze Thaw-Cycle (Seconds): 5 Post-Care Instructions: I reviewed with the patient in detail post-care instructions. Patient is to wear sunprotection, and avoid picking at any of the treated lesions. Pt may apply Aquaphor  to crusted or scabbing areas.